# Patient Record
Sex: FEMALE | Race: BLACK OR AFRICAN AMERICAN | NOT HISPANIC OR LATINO | Employment: UNEMPLOYED | URBAN - NONMETROPOLITAN AREA
[De-identification: names, ages, dates, MRNs, and addresses within clinical notes are randomized per-mention and may not be internally consistent; named-entity substitution may affect disease eponyms.]

---

## 2024-02-02 ENCOUNTER — OFFICE VISIT (OUTPATIENT)
Dept: FAMILY MEDICINE CLINIC | Facility: CLINIC | Age: 34
End: 2024-02-02
Payer: MEDICAID

## 2024-02-02 VITALS
HEART RATE: 87 BPM | WEIGHT: 177.3 LBS | HEIGHT: 66 IN | SYSTOLIC BLOOD PRESSURE: 128 MMHG | BODY MASS INDEX: 28.49 KG/M2 | RESPIRATION RATE: 15 BRPM | DIASTOLIC BLOOD PRESSURE: 82 MMHG | OXYGEN SATURATION: 99 %

## 2024-02-02 DIAGNOSIS — Z23 ENCOUNTER FOR IMMUNIZATION: ICD-10-CM

## 2024-02-02 DIAGNOSIS — C50.812 MALIGNANT NEOPLASM OF OVERLAPPING SITES OF LEFT BREAST IN FEMALE, ESTROGEN RECEPTOR NEGATIVE: Primary | ICD-10-CM

## 2024-02-02 DIAGNOSIS — Z17.1 MALIGNANT NEOPLASM OF OVERLAPPING SITES OF LEFT BREAST IN FEMALE, ESTROGEN RECEPTOR NEGATIVE: Primary | ICD-10-CM

## 2024-02-02 DIAGNOSIS — I89.0 LYMPHEDEMA OF LEFT ARM: ICD-10-CM

## 2024-02-02 PROBLEM — C50.912: Status: ACTIVE | Noted: 2023-10-30

## 2024-02-02 PROBLEM — C78.7: Status: ACTIVE | Noted: 2023-10-30

## 2024-02-02 PROBLEM — D72.829 LEUKOCYTOSIS: Status: ACTIVE | Noted: 2023-10-22

## 2024-02-02 PROBLEM — C79.51 MALIGNANT NEOPLASM METASTATIC TO BONE: Status: ACTIVE | Noted: 2024-01-18

## 2024-02-02 PROBLEM — D64.9 ANEMIA: Status: ACTIVE | Noted: 2023-10-22

## 2024-02-02 PROBLEM — L98.8 LESION OF SKIN OF BREAST: Status: ACTIVE | Noted: 2024-02-02

## 2024-02-02 PROBLEM — R59.1 LYMPHADENOPATHY: Status: ACTIVE | Noted: 2023-10-22

## 2024-02-02 RX ORDER — FAMOTIDINE 20 MG/1
20 TABLET, FILM COATED ORAL
COMMUNITY
Start: 2023-11-29 | End: 2024-11-28

## 2024-02-02 RX ORDER — LIDOCAINE 40 MG/G
CREAM TOPICAL
COMMUNITY
Start: 2024-01-04

## 2024-02-02 RX ORDER — POLYETHYLENE GLYCOL 3350 17 G/17G
POWDER ORAL
COMMUNITY
Start: 2023-11-12

## 2024-02-02 RX ORDER — BISACODYL 10 MG
SUPPOSITORY, RECTAL RECTAL
COMMUNITY
Start: 2023-11-12

## 2024-02-02 RX ORDER — MORPHINE SULFATE 30 MG/1
30 TABLET, FILM COATED, EXTENDED RELEASE ORAL
COMMUNITY
Start: 2024-01-28 | End: 2024-02-27

## 2024-02-02 RX ORDER — HYDROMORPHONE HYDROCHLORIDE 4 MG/1
2 TABLET ORAL
COMMUNITY
Start: 2024-01-28

## 2024-02-02 RX ORDER — LIDOCAINE 50 MG/G
PATCH TOPICAL
COMMUNITY
Start: 2023-11-29

## 2024-02-02 RX ORDER — ONDANSETRON 8 MG/1
TABLET, ORALLY DISINTEGRATING ORAL
COMMUNITY

## 2024-02-02 RX ORDER — GABAPENTIN 300 MG/1
300 CAPSULE ORAL
COMMUNITY
Start: 2023-12-22

## 2024-02-02 RX ORDER — ZOLPIDEM TARTRATE 5 MG/1
TABLET ORAL
COMMUNITY
Start: 2024-01-08

## 2024-02-02 RX ORDER — METHOCARBAMOL 500 MG/1
500 TABLET, FILM COATED ORAL
COMMUNITY
Start: 2023-12-01

## 2024-02-02 RX ORDER — DEXAMETHASONE 4 MG/1
TABLET ORAL
COMMUNITY
Start: 2023-12-01

## 2024-02-02 RX ORDER — CALCIUM CARBONATE/VITAMIN D3 500MG-5MCG
1 TABLET ORAL DAILY
COMMUNITY
Start: 2024-01-25 | End: 2025-01-24

## 2024-02-02 RX ORDER — DULOXETIN HYDROCHLORIDE 60 MG/1
60 CAPSULE, DELAYED RELEASE ORAL
COMMUNITY
Start: 2023-11-29 | End: 2024-03-28

## 2024-02-02 RX ORDER — HYDROXYZINE 50 MG/1
50 TABLET, FILM COATED ORAL 2 TIMES DAILY PRN
COMMUNITY
Start: 2024-01-08

## 2024-02-02 RX ORDER — PROCHLORPERAZINE MALEATE 10 MG
10 TABLET ORAL EVERY 6 HOURS PRN
COMMUNITY
Start: 2023-12-01

## 2024-02-02 NOTE — ASSESSMENT & PLAN NOTE
Triple negative, metastatic left breast cancer to liver, lymph nodes, bones.  Currently undergoing treatment at [facility] and will need for us to sign off on her public transportation to [facility]  I did get her updated on flu and pneumonia vaccine since she is immune compromised due to ongoing Chemotherapy.

## 2024-02-02 NOTE — PROGRESS NOTES
Patient Office Visit      Patient Name: Rosey Colon  : 1990   MRN: [id number]     Chief Complaint:    Chief Complaint   Patient presents with    Metastatic breast cancer       History of Present Illness: Rosey Colon is a 33 y.o. female who is here today with her sister to discuss stage IV breast cancer.  Her main issue is she has to have a primary care in the county where she lives to order public transportation so patient can get to her appointments.  Patient originally from [place].  Her sister moved her here when she found out about the breast cancer.    Her initial admission to [facility] was 10/22/2023 with a discharge date of 2023.    Patient was pregnant in  complaining of a left breast mass with discharge and was initially told that this was due to a milk duct.  She was in [place] at the time, biopsies just of breast cancer and the scans not suggestive of distant mets.  Her sister is a nurse who works at [employer].  Patient's sister took her to the [facility] where she was admitted from 10/16- 10/22.  She was treated with antibiotics.  Evidently transfer to [facility] was attempted but patient ended up leaving [facility] and came through the emergency department at  for admission.  She had an open wound of the left breast that was getting worse with discharge complaining of a sharp pain radiating to her left arm.  Her left arm was getting swollen, she was experiencing decreased appetite and weight loss.    Patient's first biopsy was taken in 2022 when [place] went [facility] and she reports this was positive for cancer but she was lost to follow-up.  A few months later the lump turned into an open wound and continued to progress.    Upon admission to [facility], patient had a left breast fungating mass with metastasis to the liver, lymph nodes and skeleton.  Pathology and records from outside  "facility revealed triple negative cancer.  She completed initial radiation treatment during her first admission to [facility].  She transferred her care to Dr. Garcia in [place] but she did not like the infusion center at [facility] due to a lack of privacy so transferred care back to  where she is currently under going chemotherapy.  She still has an open wound left breast but she says the tumor has shrunk significantly in size.    She needs primary care to order her transportation to [facility] and to have a provider locally where she can be seen for minor issues.    Subjective      Review of Systems:         Past Medical History:   Past Medical History:   Diagnosis Date    Breast cancer        Past Surgical History: History reviewed. No pertinent surgical history.    Family History: History reviewed. No pertinent family history.    Social History:   Social History     Socioeconomic History    Marital status: Single   Tobacco Use    Smoking status: Never    Smokeless tobacco: Never   Vaping Use    Vaping Use: Never used   Substance and Sexual Activity    Alcohol use: Never    Drug use: Never    Sexual activity: Defer       Allergies:   No Known Allergies    Objective     Physical Exam:  Vital Signs:   Vitals:    02/02/24 0919   BP: 128/82   BP Location: Right arm   Patient Position: Sitting   Cuff Size: Adult   Pulse: 87   Resp: 15   SpO2: 99%   Weight: 80.4 kg (177 lb 4.8 oz)   Height: 167.6 cm (66\")     Body mass index is 28.62 kg/m².   BMI is >= 25 and <30. (Overweight) The following options were offered after discussion;: none (medical contraindication)       Physical Exam  Constitutional:       General: She is not in acute distress.  Chest:      Comments: Large ulcerated mass left breast left upper outer quadrant.  Moderate lymphedema of the left upper extremity.  Neurological:      Mental Status: She is alert and oriented to person, place, and time.   Psychiatric:         Mood " and Affect: Mood normal.         Behavior: Behavior normal.         Thought Content: Thought content normal.         Judgment: Judgment normal.         Procedures    Assessment / Plan      Assessment/Plan:   Diagnoses and all orders for this visit:    1. Malignant neoplasm of overlapping sites of left breast in female, estrogen receptor negative (Primary)  Assessment & Plan:  Triple negative, metastatic left breast cancer to liver, lymph nodes, bones.  Currently undergoing treatment at [facility] and will need for us to sign off on her public transportation to [facility]  I did get her updated on flu and pneumonia vaccine since she is immune compromised due to ongoing Chemotherapy.      2. Lymphedema of left arm  Assessment & Plan:  Patient has lymphedema of the left arm.  I offered referral to lymphedema physical therapist.  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      3. Encounter for immunization  -     Fluzone (or Fluarix & Flulaval for VFC) >6mos  -     Pneumococcal Conjugate Vaccine 20-Valent All           Medications:     Current Outpatient Medications:     bisacodyl (DULCOLAX) 10 MG suppository, , Disp: , Rfl:     dexAMETHasone (DECADRON) 4 MG tablet, , Disp: , Rfl:     DULoxetine (CYMBALTA) 60 MG capsule, Take 1 capsule by mouth., Disp: , Rfl:     famotidine (PEPCID) 20 MG tablet, Take 1 tablet by mouth., Disp: , Rfl:     gabapentin (NEURONTIN) 300 MG capsule, Take 1 capsule by mouth., Disp: , Rfl:     HYDROmorphone (DILAUDID) 4 MG tablet, Take 0.5 tablets by mouth., Disp: , Rfl:     hydrOXYzine (ATARAX) 50 MG tablet, Take 1 tablet by mouth 2 (Two) Times a Day As Needed for Anxiety., Disp: , Rfl:     lidocaine (LIDODERM) 5 %, APPLY 3 PATCHES TO THE SKIN EVERY DAY, TAKE OFF FOR 12 HOURS OR AS  DIRECTED, Disp: , Rfl:     lidocaine (LMX) 4 % cream, APPLY 1 APPLICATION TOPICALLY 1 (ONE) TIME FOR 1 DOSE., Disp: , Rfl:     methocarbamol (ROBAXIN) 500 MG tablet, Take 1 tablet by mouth., Disp: , Rfl:     Morphine (MS CONTIN) 30 MG 12 hr tablet, Take 1 tablet by mouth., Disp: , Rfl:     mupirocin (BACTROBAN) 2 % ointment, , Disp: , Rfl:     ondansetron ODT (ZOFRAN-ODT) 8 MG disintegrating tablet, TAKE 1 TABLET BY MOUTH EVERY 8 HOURS IF NEEDED FOR NAUSEA OR VOMITING., Disp: , Rfl:     OYSCO 500 + D 500-5 MG-MCG tablet per tablet, Take 1 tablet by mouth Daily., Disp: , Rfl:     PEG 3350 17 GM/SCOOP powder, , Disp: , Rfl:     prochlorperazine (COMPAZINE) 10 MG tablet, Take 1 tablet by mouth Every 6 (Six) Hours As Needed., Disp: , Rfl:     zolpidem (AMBIEN) 5 MG tablet, TAKE ONE TABLET BY MOUTH EVERY HOUR AS NEEDED FOR SLEEP, Disp: , Rfl:     I spent 60 minutes caring for Rosey on this date of service. This time includes time spent by me in the following activities:preparing for the visit, obtaining and/or reviewing a separately obtained history, performing a medically appropriate examination and/or evaluation , counseling and educating the patient/family/caregiver, documenting information in the medical record, and reviewing records from [facility].    Follow Up:   Return if symptoms worsen or fail to improve.    Nissa Senior PA-C   [facility]     NOTE TO PATIENT: The 21st Century Cures Act makes medical notes like these available to patients in the interest of transparency. However, be advised this is a medical document. It is intended as peer to peer communication. It is written in medical language and may contain abbreviations or verbiage that are unfamiliar. It may appear blunt or direct. Medical documents are intended to carry relevant information, facts as evident, and the clinical opinion of the practitioner.

## 2024-02-02 NOTE — ASSESSMENT & PLAN NOTE
Patient has lymphedema of the left arm.  I offered referral to lymphedema physical therapist.  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   no

## 2024-06-15 ENCOUNTER — READMISSION MANAGEMENT (OUTPATIENT)
Dept: CALL CENTER | Facility: HOSPITAL | Age: 34
End: 2024-06-15
Payer: MEDICAID

## 2024-06-15 NOTE — OUTREACH NOTE
Prep Survey      Flowsheet Row Responses   [facility] facility patient discharged from? Non-BH   Is LACE score < 7 ? Non-BH Discharge   Eligibility [facility]   Hospital    Date of Admission 06/07/24   Date of Discharge 06/15/24   Discharge Disposition Home or Self Care   Discharge diagnosis Malignant neoplasm mets to bone   Does the patient have one of the following disease processes/diagnoses(primary or secondary)? Other   Does the patient have Home health ordered? No   Prep survey completed? Yes            MARYJANE LOZANO - Registered Nurse

## 2024-06-17 ENCOUNTER — TRANSITIONAL CARE MANAGEMENT TELEPHONE ENCOUNTER (OUTPATIENT)
Dept: CALL CENTER | Facility: HOSPITAL | Age: 34
End: 2024-06-17
Payer: MEDICAID

## 2024-06-17 NOTE — OUTREACH NOTE
Call Center TCM Note      Flowsheet Row Responses   [facility] facility patient discharged from? Non-BH   Does the patient have one of the following disease processes/diagnoses(primary or secondary)? Other   TCM attempt successful? No   Unsuccessful attempts Attempt 2            Odalys Rosa RN    6/17/2024, 15:27 EDT

## 2024-06-17 NOTE — OUTREACH NOTE
Call Center TCM Note      Flowsheet Row Responses   [facility] patient discharged from? Non-BH   Does the patient have one of the following disease processes/diagnoses(primary or secondary)? Other   TCM attempt successful? No   Unsuccessful attempts Attempt 1            Odalys Rosa RN    6/17/2024, 11:55 EDT

## 2024-06-18 ENCOUNTER — TRANSITIONAL CARE MANAGEMENT TELEPHONE ENCOUNTER (OUTPATIENT)
Dept: CALL CENTER | Facility: HOSPITAL | Age: 34
End: 2024-06-18
Payer: MEDICAID

## 2024-06-18 NOTE — OUTREACH NOTE
Call Center TCM Note      Flowsheet Row Responses   [facility] patient discharged from? Non-  []   Does the patient have one of the following disease processes/diagnoses(primary or secondary)? Other   TCM attempt successful? No   Unsuccessful attempts Attempt 3   Revoked Reason Other  [non  PCP]            Og Turner RN    6/18/2024, 11:22 EDT